# Patient Record
Sex: FEMALE | Race: OTHER | Employment: FULL TIME | URBAN - METROPOLITAN AREA
[De-identification: names, ages, dates, MRNs, and addresses within clinical notes are randomized per-mention and may not be internally consistent; named-entity substitution may affect disease eponyms.]

---

## 2017-09-09 ENCOUNTER — HOSPITAL ENCOUNTER (OUTPATIENT)
Dept: MAMMOGRAPHY | Facility: HOSPITAL | Age: 37
Discharge: HOME | End: 2017-09-09
Attending: INTERNAL MEDICINE
Payer: COMMERCIAL

## 2017-09-09 DIAGNOSIS — Z12.31 ENCOUNTER FOR SCREENING MAMMOGRAM FOR MALIGNANT NEOPLASM OF BREAST: ICD-10-CM

## 2017-09-09 PROCEDURE — 77067 SCR MAMMO BI INCL CAD: CPT | Performed by: INTERNAL MEDICINE

## 2017-12-01 ENCOUNTER — OFFICE VISIT (OUTPATIENT)
Dept: FAMILY MEDICINE CLINIC | Facility: CLINIC | Age: 37
End: 2017-12-01

## 2017-12-01 VITALS
DIASTOLIC BLOOD PRESSURE: 70 MMHG | SYSTOLIC BLOOD PRESSURE: 100 MMHG | BODY MASS INDEX: 26.58 KG/M2 | TEMPERATURE: 98 F | HEART RATE: 91 BPM | RESPIRATION RATE: 14 BRPM | HEIGHT: 63 IN | WEIGHT: 150 LBS | OXYGEN SATURATION: 98 %

## 2017-12-01 DIAGNOSIS — J06.9 VIRAL URI WITH COUGH: Primary | ICD-10-CM

## 2017-12-01 PROCEDURE — 99202 OFFICE O/P NEW SF 15 MIN: CPT | Performed by: PHYSICIAN ASSISTANT

## 2017-12-01 RX ORDER — ATORVASTATIN CALCIUM 10 MG/1
TABLET, FILM COATED ORAL
COMMUNITY
Start: 2017-10-17

## 2017-12-01 RX ORDER — NORETHINDRONE ACETATE AND ETHINYL ESTRADIOL 1.5-30(21)
KIT ORAL
COMMUNITY
Start: 2017-11-20 | End: 2018-11-08 | Stop reason: ALTCHOICE

## 2017-12-01 RX ORDER — BROMPHENIRAMINE MALEATE, PSEUDOEPHEDRINE HYDROCHLORIDE, AND DEXTROMETHORPHAN HYDROBROMIDE 2; 30; 10 MG/5ML; MG/5ML; MG/5ML
10 SYRUP ORAL 4 TIMES DAILY PRN
Qty: 120 ML | Refills: 0 | Status: SHIPPED | OUTPATIENT
Start: 2017-12-01 | End: 2018-11-08 | Stop reason: ALTCHOICE

## 2017-12-01 NOTE — PATIENT INSTRUCTIONS
Please follow up with your PCP if no improvement within 5-7 days. Go directly to the ER for any acute worsening of symptoms. Take Emergen-C one packet every 12 hours, Zicam zinc supplement     You appear to have a viral upper respiratory infection.   Anti CHILDREN'S IBUPROFEN, 4 tsp (1 capful, which is 400 mg, a usual adult dose) every 4-6 hrs.   This works a bit more immediately for your pain by it's anti-inflammatory effect directly in the throat first.    For cough and congestion:    Dextromethorphan/gua Stay home from work or school if you have fever, or symptoms below the neck (significant cough, stomach ache/nausea, diarrhea). You are likely to be shedding the most viral particles during this time.       Do not return to work or school until [facility]

## 2017-12-01 NOTE — PROGRESS NOTES
CHIEF COMPLAINT:   Patient presents with:  URI: ichy throat, nasal congestion, cough productive, headache, x 3 days. some chills, body aches. HPI:   Lauro Cabral is a 44year old female who presents for upper respiratory symptoms for  3 days.  Pamela THROAT: Oral mucosa pink, moist. Posterior pharynx is not erythematous. No exudates. Tonsils 1+/4. NECK: Supple, non-tender  LUNGS: clear to auscultation bilaterally, no wheezes, rales or rhonchi. No diminished breath sounds. Breathing is non labored. If fever persists, or returns later in illness, OR if symptoms last for >14 days, this is likely not from the virus but instead can be consistent with a secondary bacterial infection and would need re-evaluation either here in [address] or with your PCP.     Ciarra Warm mist vaporizer with camphor (Vicks VapoSteam). For nose and sinus congestion:   Pseudoephedrine (Sudafed 4- or 12-hour tablets, ask for it at the pharmacy counter). Take for the next 3-5; follow the package directions.     Phenylephrine (Sudafed PE

## 2018-01-04 ENCOUNTER — MED REC SCAN ONLY (OUTPATIENT)
Dept: HEMATOLOGY/ONCOLOGY | Facility: HOSPITAL | Age: 38
End: 2018-01-04

## 2018-11-08 ENCOUNTER — OFFICE VISIT (OUTPATIENT)
Dept: FAMILY MEDICINE CLINIC | Facility: CLINIC | Age: 38
End: 2018-11-08
Payer: COMMERCIAL

## 2018-11-08 VITALS
HEIGHT: 63 IN | HEART RATE: 100 BPM | SYSTOLIC BLOOD PRESSURE: 118 MMHG | DIASTOLIC BLOOD PRESSURE: 68 MMHG | WEIGHT: 160 LBS | BODY MASS INDEX: 28.35 KG/M2 | RESPIRATION RATE: 12 BRPM | OXYGEN SATURATION: 98 % | TEMPERATURE: 99 F

## 2018-11-08 DIAGNOSIS — A08.4 VIRAL GASTROENTERITIS: Primary | ICD-10-CM

## 2018-11-08 PROCEDURE — 99213 OFFICE O/P EST LOW 20 MIN: CPT | Performed by: NURSE PRACTITIONER

## 2018-11-08 NOTE — PATIENT INSTRUCTIONS
· Rest as much as possible  · Try to drink lots of fluids. Start with small sips of water every 15 minutes as long as you can keep fluid down. · Avoid fruit juices and soda. Try water, Pedialyte, and/or 1/2 strength Gatorade/Power Aid.   Drink 1-2 oz with influenza. In fact, it can happen from food poisoning, but it can also happen when germs are passed from person-to-person or contaminated surface (toothbrush, cutting board, toilet) to a person.   Either illness can cause these symptoms:  · Abdominal p after using cutting boards, counter-tops, and knives (and other utensils) that have been in contact with raw foods. · Wash and then peel fruits and vegetables. · Keep uncooked meats away from cooked and ready-to-eat foods.   · Use a food thermometer when poultry, or fish. · Limit fiber. Don’t eat raw or cooked vegetables, fresh fruits except bananas, and bran cereals. · Limit caffeine and chocolate. · Don’t use spices or seasonings except salt.   · Resume a normal diet over time, as you feel better and y

## 2018-11-08 NOTE — PROGRESS NOTES
CHIEF COMPLAINT:   Patient presents with:  Nausea  Vomiting  Body ache and/or chills      HPI:   Sagrario Hyde is a 43year old female who presents for complaints of nausea and vomitins for almost 24 hours.   Reports generalized mild cramping abdominal GI:See HPI  NEURO: denies headaches, loss of bowel or bladder control    EXAM:   /68 (BP Location: Right arm, Patient Position: Sitting, Cuff Size: adult)   Pulse 100   Temp 98.9 °F (37.2 °C) (Oral)   Resp 12   Ht 63\"   Wt 160 lb   SpO2 98%   BMI 28 · Avoid fruit juices and soda. Try water, Pedialyte, and/or 1/2 strength Gatorade/Power Aid. Drink 1-2 oz. Every hour when tolerated. Goal is to drink 1500-2000ml per day.   · Advance diet slowly as tolerate: start with clear liquids (jello, broth) then Viral gastroenteritis is also an illness from a virus that affects the stomach and intestinal tract. Many people call it the “stomach flu,” but it has nothing to do with influenza.  In fact, it can happen from food poisoning, but it can also happen when terri · Wash your hands or use alcohol-based hand  before and after preparing food. Keep in mind that people with diarrhea or vomiting should not prepare food for others.   · Wash your hands or use alcohol-based hand  after using cutting boards, · Plain noodles, rice, mashed potatoes, chicken noodle soup, or rice soup  · Unsweetened canned fruit (no pineapple)  · Bananas  As you recover:  · Limit fat intake to less than 15 grams per day.  Don’t eat margarine, butter, oils, mayonnaise, sauces, Ines López The patient indicates understanding of these issues and agrees to the plan.

## 2019-06-29 ENCOUNTER — HOSPITAL ENCOUNTER (OUTPATIENT)
Dept: MAMMOGRAPHY | Facility: HOSPITAL | Age: 39
Discharge: HOME | End: 2019-06-29
Attending: INTERNAL MEDICINE
Payer: COMMERCIAL

## 2019-06-29 DIAGNOSIS — Z12.31 ENCOUNTER FOR SCREENING MAMMOGRAM FOR MALIGNANT NEOPLASM OF BREAST: ICD-10-CM

## 2019-06-29 PROCEDURE — 77063 BREAST TOMOSYNTHESIS BI: CPT | Performed by: INTERNAL MEDICINE

## 2019-06-29 PROCEDURE — 77067 SCR MAMMO BI INCL CAD: CPT | Performed by: INTERNAL MEDICINE

## 2019-07-18 ENCOUNTER — HOSPITAL ENCOUNTER (OUTPATIENT)
Dept: ULTRASOUND IMAGING | Facility: HOSPITAL | Age: 39
Discharge: HOME | End: 2019-07-18
Attending: INTERNAL MEDICINE
Payer: COMMERCIAL

## 2019-07-18 ENCOUNTER — HOSPITAL ENCOUNTER (OUTPATIENT)
Dept: MAMMOGRAPHY | Facility: HOSPITAL | Age: 39
Discharge: HOME | End: 2019-07-18
Attending: INTERNAL MEDICINE
Payer: COMMERCIAL

## 2019-07-18 DIAGNOSIS — R92.8 ABNORMAL MAMMOGRAM: ICD-10-CM

## 2019-07-18 PROCEDURE — 77065 DX MAMMO INCL CAD UNI: CPT | Performed by: INTERNAL MEDICINE

## 2019-07-18 PROCEDURE — 77061 BREAST TOMOSYNTHESIS UNI: CPT | Performed by: INTERNAL MEDICINE

## 2019-07-18 PROCEDURE — 76642 ULTRASOUND BREAST LIMITED: CPT | Performed by: INTERNAL MEDICINE

## 2019-07-18 RX ORDER — OMEGA-3-ACID ETHYL ESTERS 1 G/1
1 CAPSULE, LIQUID FILLED ORAL DAILY
COMMUNITY

## 2019-07-18 NOTE — IMAGING NOTE
This nurse introduced self and role of breast coordinator. Discussed recommended breast biopsy with patient. Pt was recommended by  Dr Dc Melendez  to have a  Right  breast ultrasound guided biopsy. She will stop  Fish oil.  She was very tearful emotio patient Radiology’s protocol regarding medications, as well as over-the-counter vitamin or herbal supplements, as follows:   -All herbal supplements, Vitamin E, Fish Oil  green tea ,all nsaids like   ibuprofen, Motrin, Advil, Aleve, or other anti-inflammat check in with diagnostic east .  Educated patient that some soreness  may occur after biopsy. Discussed use of a supportive bra and ice packs after procedure, to decrease soreness. Tylenol only for discomfort unless they have an allergy to tylenol .   Dis

## 2019-07-19 ENCOUNTER — TELEPHONE (OUTPATIENT)
Dept: MAMMOGRAPHY | Facility: HOSPITAL | Age: 39
End: 2019-07-19

## 2019-07-19 NOTE — TELEPHONE ENCOUNTER
Called to review denies taking blood thinning meds for month. Reinforced biopsy instructions. She will eat breakfast and is aware 2 days for results no heavylifting x2-3 days and no hot tubs baths swimming x5 days and until bx site closed and healed.  Mrs Nazario

## 2019-07-22 ENCOUNTER — HOSPITAL ENCOUNTER (OUTPATIENT)
Dept: ULTRASOUND IMAGING | Facility: HOSPITAL | Age: 39
Discharge: HOME | End: 2019-07-22
Attending: INTERNAL MEDICINE
Payer: COMMERCIAL

## 2019-07-22 VITALS — SYSTOLIC BLOOD PRESSURE: 110 MMHG | DIASTOLIC BLOOD PRESSURE: 67 MMHG | HEART RATE: 76 BPM

## 2019-07-22 DIAGNOSIS — N63.10 BREAST MASS, RIGHT: ICD-10-CM

## 2019-07-22 PROCEDURE — 88342 IMHCHEM/IMCYTCHM 1ST ANTB: CPT

## 2019-07-22 PROCEDURE — 19083 BX BREAST 1ST LESION US IMAG: CPT | Performed by: INTERNAL MEDICINE

## 2019-07-22 PROCEDURE — 88305 TISSUE EXAM BY PATHOLOGIST: CPT

## 2019-07-22 NOTE — PROCEDURES
[facility]  Procedure Note    Cheryl Tuvamsi Patient Status:  Outpatient    1980 MRN [id number]   Location [address] [facility] Attending Milind Mabry Day # 0 PCP MACKENZIE ESPINOZA     Procedure: Right breast u

## 2019-07-24 ENCOUNTER — TELEPHONE (OUTPATIENT)
Dept: HEMATOLOGY/ONCOLOGY | Facility: HOSPITAL | Age: 39
End: 2019-07-24

## 2019-07-24 ENCOUNTER — OFFICE VISIT (OUTPATIENT)
Dept: FAMILY MEDICINE CLINIC | Facility: CLINIC | Age: 39
End: 2019-07-24
Payer: COMMERCIAL

## 2019-07-24 DIAGNOSIS — G89.18 POST PROCEDURE DISCOMFORT: Primary | ICD-10-CM

## 2019-07-24 NOTE — TELEPHONE ENCOUNTER
Patient is s/p right breast US guided biopsy, performed 7/22/19. Phoned patient and introduced myself as Breast RN Navigator. Patient denies right breast complaints at this time.     Shared with patient benign pathology results as follows:    Right breast

## 2019-07-24 NOTE — PROGRESS NOTES
Pt had needle breast biopsy 7/22/19 and is requesting work note to not have to lift groceries or push carts as a part of her usual job. Instructed pt to refer to her discharge instructions or contact Oncology Nurse Navigator.  On-line Nurse Navigator note s

## 2020-01-17 ENCOUNTER — HOSPITAL ENCOUNTER (OUTPATIENT)
Dept: ULTRASOUND IMAGING | Facility: HOSPITAL | Age: 40
Discharge: HOME | End: 2020-01-17
Attending: FAMILY MEDICINE
Payer: COMMERCIAL

## 2020-01-17 DIAGNOSIS — Z09 FOLLOW-UP EXAM, 3-6 MONTHS SINCE PREVIOUS EXAM: ICD-10-CM

## 2020-01-17 PROCEDURE — 76642 ULTRASOUND BREAST LIMITED: CPT | Performed by: FAMILY MEDICINE

## 2020-01-19 ENCOUNTER — OFFICE VISIT (OUTPATIENT)
Dept: FAMILY MEDICINE CLINIC | Facility: CLINIC | Age: 40
End: 2020-01-19
Payer: COMMERCIAL

## 2020-01-19 VITALS
HEART RATE: 92 BPM | DIASTOLIC BLOOD PRESSURE: 57 MMHG | SYSTOLIC BLOOD PRESSURE: 124 MMHG | HEIGHT: 63.5 IN | TEMPERATURE: 98 F | OXYGEN SATURATION: 99 % | RESPIRATION RATE: 16 BRPM | BODY MASS INDEX: 31.15 KG/M2 | WEIGHT: 178 LBS

## 2020-01-19 DIAGNOSIS — J02.0 STREP PHARYNGITIS: Primary | ICD-10-CM

## 2020-01-19 LAB
CONTROL LINE PRESENT WITH A CLEAR BACKGROUND (YES/NO): YES YES/NO
KIT LOT #: ABNORMAL NUMERIC
STREP GRP A CUL-SCR: POSITIVE

## 2020-01-19 PROCEDURE — 87880 STREP A ASSAY W/OPTIC: CPT | Performed by: NURSE PRACTITIONER

## 2020-01-19 PROCEDURE — 99213 OFFICE O/P EST LOW 20 MIN: CPT | Performed by: NURSE PRACTITIONER

## 2020-01-19 RX ORDER — AMOXICILLIN 500 MG/1
500 TABLET, FILM COATED ORAL 2 TIMES DAILY
Qty: 20 TABLET | Refills: 0 | Status: SHIPPED | OUTPATIENT
Start: 2020-01-19 | End: 2020-01-29

## 2020-01-19 NOTE — PROGRESS NOTES
CHIEF COMPLAINT:   Patient presents with: Tonsillitis: swollen with green mucous      HPI:     Crissy Suggs is a 35year old female presents to clinic with symptoms of sore throat. Patient has had since yesterdays.  Symptoms have worsened since ons THROAT: oral mucosa pink, moist. Posterior pharynx erythematous and injected. No exudates. Tonsils 2/4. Breath not malodorous. No uvular deviation. No drooling. No trismus. No muffled voice. NECK: supple  LUNGS: clear to auscultation bilaterally.   No · Do not share utensils or drinks with anyone.    · Good handwashing.    · Get plenty of rest.    · Can use over the counter benzocaine such as Cepacol throat lozenges or Chloroseptic throat spray to soothe sore throat.    · Warm salt water gargles 2-3 natty Use medicine for more relief  Over-the-counter medicine can reduce sore throat symptoms. Ask your pharmacist if you have questions about which medicine to use:  · Ease pain with anesthetic sprays. Aspirin or an aspirin substitute also helps.  Remember, shefali · Rest at home. Drink plenty of fluids to you won't get dehydrated. · No work or school for the first 2 days of taking the antibiotics. After this time, you will not be contagious.  You can then return to school or work if you are feeling better.   · Dane Johnson · Don’t have close contact with people who have sore throats, colds, or other upper respiratory infections. · Don’t smoke, and stay away from secondhand smoke. Date Last Reviewed: 11/1/2017  © 4521-7839 [employer] [address].  [address] [facility] [place] Jamir,

## 2020-01-19 NOTE — PATIENT INSTRUCTIONS
Comfort measures explained and discussed:    · Take the full course of your antibiotic even if you are feeling better. · You are considered to be contagious until you have been on antibiotics for 24 hours.    · You can return to school and/or work o · Run a cool-air humidifier in your room overnight. · Avoid cigarette smoke.   · Suck on throat lozenges, cough drops, hard candy, ice chips, or frozen fruit-juice bars. Use the sugar-free versions if your diet or medical condition requires them.   Gargle © 2842-0468 [employer] [address]. [address] [facility], [facility] [place]. All rights reserved. This information is not intended as a substitute for professional medical care. Always follow your healthcare professional's instructions.           Phary Call your healthcare provider right away if any of these occur:  · Fever of 100.4ºF (38ºC) or higher, or as directed by your healthcare provider  · New or worsening ear pain, sinus pain, or headache  · Painful lumps in the back of neck  · Stiff neck  · Lym

## 2020-06-15 ENCOUNTER — OFFICE VISIT (OUTPATIENT)
Dept: AUDIOLOGY | Facility: CLINIC | Age: 40
End: 2020-06-15
Payer: COMMERCIAL

## 2020-06-15 ENCOUNTER — OFFICE VISIT (OUTPATIENT)
Dept: OTOLARYNGOLOGY | Facility: CLINIC | Age: 40
End: 2020-06-15
Payer: COMMERCIAL

## 2020-06-15 VITALS
TEMPERATURE: 98 F | BODY MASS INDEX: 31.18 KG/M2 | DIASTOLIC BLOOD PRESSURE: 75 MMHG | HEIGHT: 63 IN | WEIGHT: 176 LBS | SYSTOLIC BLOOD PRESSURE: 117 MMHG

## 2020-06-15 DIAGNOSIS — H90.42 SENSORINEURAL HEARING LOSS (SNHL) OF LEFT EAR WITH UNRESTRICTED HEARING OF RIGHT EAR: Primary | ICD-10-CM

## 2020-06-15 DIAGNOSIS — M26.609 TMJ (TEMPOROMANDIBULAR JOINT SYNDROME): ICD-10-CM

## 2020-06-15 DIAGNOSIS — H91.93 BILATERAL HEARING LOSS, UNSPECIFIED HEARING LOSS TYPE: Primary | ICD-10-CM

## 2020-06-15 DIAGNOSIS — F45.8 BRUXISM (TEETH GRINDING): ICD-10-CM

## 2020-06-15 PROCEDURE — 99243 OFF/OP CNSLTJ NEW/EST LOW 30: CPT | Performed by: OTOLARYNGOLOGY

## 2020-06-15 PROCEDURE — 92557 COMPREHENSIVE HEARING TEST: CPT | Performed by: AUDIOLOGIST

## 2020-06-15 PROCEDURE — 92567 TYMPANOMETRY: CPT | Performed by: AUDIOLOGIST

## 2020-06-15 NOTE — PROGRESS NOTES
AUDIOLOGY REPORT      Jesse Reis is a 38year old female     Referring Provider: Goldie Zaragoza   YOB: 1980  Medical Record: [id number]      Patient Hearing History:  Patient referred by Dr. Vy Sheets for hearing loss.   Patient reports that

## 2020-07-17 ENCOUNTER — HOSPITAL ENCOUNTER (OUTPATIENT)
Dept: MAMMOGRAPHY | Facility: HOSPITAL | Age: 40
Discharge: HOME | End: 2020-07-17
Attending: INTERNAL MEDICINE
Payer: COMMERCIAL

## 2020-07-17 ENCOUNTER — HOSPITAL ENCOUNTER (OUTPATIENT)
Dept: ULTRASOUND IMAGING | Facility: HOSPITAL | Age: 40
Discharge: HOME | End: 2020-07-17
Attending: INTERNAL MEDICINE
Payer: COMMERCIAL

## 2020-07-17 DIAGNOSIS — Z12.31 ENCOUNTER FOR SCREENING MAMMOGRAM FOR MALIGNANT NEOPLASM OF BREAST: ICD-10-CM

## 2020-07-17 PROCEDURE — 77066 DX MAMMO INCL CAD BI: CPT | Performed by: INTERNAL MEDICINE

## 2020-07-17 PROCEDURE — 77062 BREAST TOMOSYNTHESIS BI: CPT | Performed by: INTERNAL MEDICINE

## 2020-07-17 PROCEDURE — 76642 ULTRASOUND BREAST LIMITED: CPT | Performed by: INTERNAL MEDICINE

## 2020-08-18 ENCOUNTER — OFFICE VISIT (OUTPATIENT)
Dept: OBGYN CLINIC | Facility: CLINIC | Age: 40
End: 2020-08-18
Payer: COMMERCIAL

## 2020-08-18 VITALS
DIASTOLIC BLOOD PRESSURE: 64 MMHG | SYSTOLIC BLOOD PRESSURE: 93 MMHG | BODY MASS INDEX: 31 KG/M2 | WEIGHT: 174.19 LBS | HEART RATE: 89 BPM

## 2020-08-18 DIAGNOSIS — N92.0 MENORRHAGIA WITH REGULAR CYCLE: ICD-10-CM

## 2020-08-18 DIAGNOSIS — R92.8 ABNORMAL MAMMOGRAM OF RIGHT BREAST: ICD-10-CM

## 2020-08-18 DIAGNOSIS — Z01.419 ENCOUNTER FOR GYNECOLOGICAL EXAMINATION WITHOUT ABNORMAL FINDING: Primary | ICD-10-CM

## 2020-08-18 PROCEDURE — 3074F SYST BP LT 130 MM HG: CPT | Performed by: OBSTETRICS & GYNECOLOGY

## 2020-08-18 PROCEDURE — 99386 PREV VISIT NEW AGE 40-64: CPT | Performed by: OBSTETRICS & GYNECOLOGY

## 2020-08-18 PROCEDURE — 3078F DIAST BP <80 MM HG: CPT | Performed by: OBSTETRICS & GYNECOLOGY

## 2020-08-23 PROBLEM — N92.0 MENORRHAGIA WITH REGULAR CYCLE: Status: ACTIVE | Noted: 2020-08-23

## 2020-08-24 NOTE — PROGRESS NOTES
HPI:    Patient ID: Brijesh Singleton is a 38year old female. HPI \" Jewels \" is [phone] with menses q 28d / 5d / heavy chronically. TL for [facility]. She did have a lesion protruding from anal area post op second delivery.  It actually turned out to be a Soft. She exhibits no distension and no mass. There is no tenderness. There is no rebound and no guarding. Genitourinary:    Vagina and uterus normal.   No breast discharge. There is no rash or lesion on the right labia.  There is no rash or lesion on the

## 2020-12-22 ENCOUNTER — HOSPITAL ENCOUNTER (OUTPATIENT)
Dept: ULTRASOUND IMAGING | Facility: HOSPITAL | Age: 40
Discharge: HOME | End: 2020-12-22
Attending: INTERNAL MEDICINE
Payer: COMMERCIAL

## 2020-12-22 DIAGNOSIS — E04.9 NONTOXIC GOITER, UNSPECIFIED: ICD-10-CM

## 2020-12-22 PROCEDURE — 76536 US EXAM OF HEAD AND NECK: CPT | Performed by: INTERNAL MEDICINE

## 2021-01-19 ENCOUNTER — HOSPITAL ENCOUNTER (OUTPATIENT)
Dept: ULTRASOUND IMAGING | Facility: HOSPITAL | Age: 41
Discharge: HOME | End: 2021-01-19
Attending: OBSTETRICS & GYNECOLOGY
Payer: COMMERCIAL

## 2021-01-19 DIAGNOSIS — R92.8 ABNORMAL MAMMOGRAM OF RIGHT BREAST: ICD-10-CM

## 2021-01-19 PROCEDURE — 76642 ULTRASOUND BREAST LIMITED: CPT | Performed by: OBSTETRICS & GYNECOLOGY

## 2021-01-20 ENCOUNTER — TELEPHONE (OUTPATIENT)
Dept: OBGYN CLINIC | Facility: CLINIC | Age: 41
End: 2021-01-20

## 2021-01-20 NOTE — TELEPHONE ENCOUNTER
----- Message from Marie Conroy DO sent at 1/19/2021 11:36 AM CST -----  Narendra Mcdonough. The ultrasound appears stable and benign. I'll ask our Nurse to place your 6 month follow up bilateral screen mammogram for July as I won't need to see you until August. See you then.

## 2021-03-12 DIAGNOSIS — Z23 NEED FOR VACCINATION: ICD-10-CM

## 2021-03-16 ENCOUNTER — IMMUNIZATION (OUTPATIENT)
Dept: LAB | Facility: HOSPITAL | Age: 41
End: 2021-03-16
Attending: HOSPITALIST
Payer: COMMERCIAL

## 2021-03-16 DIAGNOSIS — Z23 NEED FOR VACCINATION: Primary | ICD-10-CM

## 2021-03-16 PROCEDURE — 0011A SARSCOV2 VAC 100MCG/0.5ML IM: CPT

## 2021-04-13 ENCOUNTER — IMMUNIZATION (OUTPATIENT)
Dept: LAB | Facility: HOSPITAL | Age: 41
End: 2021-04-13
Attending: EMERGENCY MEDICINE
Payer: COMMERCIAL

## 2021-04-13 DIAGNOSIS — Z23 NEED FOR VACCINATION: Primary | ICD-10-CM

## 2021-04-13 PROCEDURE — 0012A SARSCOV2 VAC 100MCG/0.5ML IM: CPT

## 2021-07-16 ENCOUNTER — HOSPITAL ENCOUNTER (OUTPATIENT)
Dept: MAMMOGRAPHY | Facility: HOSPITAL | Age: 41
Discharge: HOME | End: 2021-07-16
Attending: OBSTETRICS & GYNECOLOGY
Payer: COMMERCIAL

## 2021-07-16 DIAGNOSIS — Z12.31 ENCOUNTER FOR SCREENING MAMMOGRAM FOR MALIGNANT NEOPLASM OF BREAST: ICD-10-CM

## 2021-07-16 PROCEDURE — 77067 SCR MAMMO BI INCL CAD: CPT | Performed by: OBSTETRICS & GYNECOLOGY

## 2021-07-16 PROCEDURE — 77063 BREAST TOMOSYNTHESIS BI: CPT | Performed by: OBSTETRICS & GYNECOLOGY

## 2021-08-20 ENCOUNTER — OFFICE VISIT (OUTPATIENT)
Dept: OBGYN CLINIC | Facility: CLINIC | Age: 41
End: 2021-08-20
Payer: COMMERCIAL

## 2021-08-20 VITALS
SYSTOLIC BLOOD PRESSURE: 113 MMHG | DIASTOLIC BLOOD PRESSURE: 74 MMHG | WEIGHT: 177 LBS | HEART RATE: 97 BPM | BODY MASS INDEX: 31 KG/M2

## 2021-08-20 DIAGNOSIS — Q51.3 BICORNUATE UTERUS: ICD-10-CM

## 2021-08-20 DIAGNOSIS — N92.0 MENORRHAGIA WITH REGULAR CYCLE: ICD-10-CM

## 2021-08-20 DIAGNOSIS — Z01.419 ENCOUNTER FOR GYNECOLOGICAL EXAMINATION WITHOUT ABNORMAL FINDING: Primary | ICD-10-CM

## 2021-08-20 DIAGNOSIS — Z12.31 SCREENING MAMMOGRAM, ENCOUNTER FOR: ICD-10-CM

## 2021-08-20 DIAGNOSIS — Z12.4 SCREENING FOR MALIGNANT NEOPLASM OF CERVIX: ICD-10-CM

## 2021-08-20 PROCEDURE — 3074F SYST BP LT 130 MM HG: CPT | Performed by: OBSTETRICS & GYNECOLOGY

## 2021-08-20 PROCEDURE — 3078F DIAST BP <80 MM HG: CPT | Performed by: OBSTETRICS & GYNECOLOGY

## 2021-08-20 PROCEDURE — 99396 PREV VISIT EST AGE 40-64: CPT | Performed by: OBSTETRICS & GYNECOLOGY

## 2021-08-20 RX ORDER — ATORVASTATIN CALCIUM 20 MG/1
20 TABLET, FILM COATED ORAL NIGHTLY
COMMUNITY

## 2021-08-23 LAB — HPV I/H RISK 1 DNA SPEC QL NAA+PROBE: NEGATIVE

## 2021-08-30 PROBLEM — Q51.3 BICORNUATE UTERUS: Status: ACTIVE | Noted: 2021-08-30

## 2021-08-30 NOTE — PROGRESS NOTES
HPI/Subjective:   Patient ID: Zaire Hawkins is a 42year old female. HPI   with menses q 28d / 5d / heavy flow affecting her ability to do activities. She has a known bicornuate uterus. No new family medical issues.  Kids are well: Nomi vale sounds: Normal breath sounds. No wheezing or rales. Abdominal:      General: There is no distension. Palpations: Abdomen is soft. There is no mass. Tenderness: There is no abdominal tenderness. There is no guarding or rebound.    Genitourinary:

## 2022-03-22 ENCOUNTER — ORDER TRANSCRIPTION (OUTPATIENT)
Dept: ADMINISTRATIVE | Facility: HOSPITAL | Age: 42
End: 2022-03-22

## 2022-07-18 ENCOUNTER — HOSPITAL ENCOUNTER (OUTPATIENT)
Dept: MAMMOGRAPHY | Facility: HOSPITAL | Age: 42
Discharge: HOME | End: 2022-07-18
Attending: OBSTETRICS & GYNECOLOGY
Payer: COMMERCIAL

## 2022-07-18 DIAGNOSIS — Z12.31 SCREENING MAMMOGRAM, ENCOUNTER FOR: ICD-10-CM

## 2022-07-18 PROCEDURE — 77063 BREAST TOMOSYNTHESIS BI: CPT | Performed by: OBSTETRICS & GYNECOLOGY

## 2022-07-18 PROCEDURE — 77067 SCR MAMMO BI INCL CAD: CPT | Performed by: OBSTETRICS & GYNECOLOGY

## 2022-09-06 ENCOUNTER — HOSPITAL ENCOUNTER (OUTPATIENT)
Dept: CT IMAGING | Facility: HOSPITAL | Age: 42
Discharge: HOME | End: 2022-09-06
Attending: INTERNAL MEDICINE

## 2022-09-06 DIAGNOSIS — Z13.6 SCREENING FOR CARDIOVASCULAR CONDITION: ICD-10-CM

## 2022-09-06 NOTE — PROGRESS NOTES
Date of Service 9/6/2022    Florencio Cullen  Date of Birth 4/26/1980    Patient Age: 36year old    PCP: Usha Meadows, DO  [address] [place] [address]    Consult Type  Type Scan/Screening: Heart Scan  Preliminary Heart Scan Score: 0                Body Mass Index  There is no height or weight on file to calculate BMI. Lipid Profile  No Lipid results on file in last 5 years . Today - NON FASTING  Total - 201  HDL - 38 goal is greater than 55  LDL - NA  Goal is less than 100  Triglycerides - 567  Goal is less than 150    She is on cholesterol medication. Nurse Review  Risk factor information and results reviewed with Nurse: Yes     /74  No medication    Recommended Follow Up:  Consult your physician regarding[de-identified] Final Heart Scan Report; Discuss potential for Incidental Finding; Cholesterol Results (Non-Fasting)    No data recorded      Recommendations for Change:  Nutrition Changes: Low Saturated Fat;Low Fat Dairy; Increase Fiber  Cholesterol Modification (goal of therapy depends upon your risk): Increase HDL (Healthy/Good) Normal >45 Men >55 Women;Decrease Triglycerides (Ugly) Normal <150     Smoking Cessation: No Change Needed  Weight Management: Decrease Current Weight     Repeat Heart Scan: 5 years if Calcium Score is 0. 0; Discuss with your Physician          Silvestre Recommended Resources:  Recommended Resources: Upcoming Classes, Medical Services and Health Library [email or web address]. Sridevi Crockett RN        Please Contact the Nurse Heart Line with any Questions or Concerns [phone].

## 2023-08-21 ENCOUNTER — OFFICE VISIT (OUTPATIENT)
Dept: OBGYN CLINIC | Facility: CLINIC | Age: 43
End: 2023-08-21

## 2023-08-21 VITALS
HEIGHT: 63.7 IN | SYSTOLIC BLOOD PRESSURE: 118 MMHG | BODY MASS INDEX: 30.59 KG/M2 | DIASTOLIC BLOOD PRESSURE: 76 MMHG | WEIGHT: 177 LBS | HEART RATE: 79 BPM

## 2023-08-21 DIAGNOSIS — N92.0 MENORRHAGIA WITH REGULAR CYCLE: ICD-10-CM

## 2023-08-21 DIAGNOSIS — Z12.31 SCREENING MAMMOGRAM FOR BREAST CANCER: ICD-10-CM

## 2023-08-21 DIAGNOSIS — Z01.411 ENCOUNTER FOR GYNECOLOGICAL EXAMINATION WITH ABNORMAL FINDING: Primary | ICD-10-CM

## 2023-08-21 PROCEDURE — 3008F BODY MASS INDEX DOCD: CPT | Performed by: OBSTETRICS & GYNECOLOGY

## 2023-08-21 PROCEDURE — 3074F SYST BP LT 130 MM HG: CPT | Performed by: OBSTETRICS & GYNECOLOGY

## 2023-08-21 PROCEDURE — 3078F DIAST BP <80 MM HG: CPT | Performed by: OBSTETRICS & GYNECOLOGY

## 2023-08-21 PROCEDURE — 99396 PREV VISIT EST AGE 40-64: CPT | Performed by: OBSTETRICS & GYNECOLOGY

## 2023-08-21 NOTE — PROGRESS NOTES
Subjective:   Patient ID: Florian Mitchell is a 39year old female. HPI   with menses q 28d / 5d / 4 heavy days. No IM / PC bleeding. TL for [facility]. No new family medical issues. She has OA right knee. Also occasional MITCH w/o pad use. History/Other:   Review of Systems   Constitutional:  Negative for appetite change, fatigue and unexpected weight change. Eyes:  Negative for visual disturbance. Respiratory:  Negative for cough and shortness of breath. Cardiovascular:  Negative for chest pain, palpitations and leg swelling. Gastrointestinal:  Negative for abdominal distention, abdominal pain, blood in stool, constipation and diarrhea. Genitourinary:  Positive for dyspareunia (chronic entry dyspareunia improved with OTC lube) and menstrual problem. Negative for dysuria, frequency, pelvic pain and urgency. Musculoskeletal:  Negative for arthralgias and myalgias. Skin:  Negative for rash. Neurological:  Negative for weakness, numbness and headaches. Psychiatric/Behavioral:  Negative for dysphoric mood. The patient is not nervous/anxious. Current Outpatient Medications   Medication Sig Dispense Refill    atorvastatin 20 MG Oral Tab Take 20 mg by mouth nightly. Multiple Vitamin (MULTI-VITAMIN DAILY OR) Take by mouth. IRON CR OR Take by mouth. Cyanocobalamin (VITAMIN B 12 OR) Take by mouth. Ergocalciferol (VITAMIN D OR) Take by mouth. Omega-3-acid Ethyl Esters 1 g Oral Cap Take 1 g by mouth daily. atorvastatin 10 MG Oral Tab  (Patient not taking: Reported on 2021 )       Allergies:No Known Allergies    Objective:   Physical Exam  Constitutional:       General: She is not in acute distress. Appearance: She is well-developed. She is not diaphoretic. Neck:      Thyroid: No thyromegaly. Cardiovascular:      Rate and Rhythm: Normal rate and regular rhythm. Heart sounds: Normal heart sounds. No murmur heard.   Pulmonary:      Effort: Pulmonary effort is normal.      Breath sounds: Normal breath sounds. No wheezing or rales. Chest:   Breasts:     Right: Normal. No mass, nipple discharge, skin change or tenderness. Left: Normal. No mass, nipple discharge, skin change or tenderness. Comments:     Abdominal:      General: There is no distension. Palpations: Abdomen is soft. There is no mass. Tenderness: There is no abdominal tenderness. There is no guarding or rebound. Genitourinary:     Labia:         Right: No rash or lesion. Left: No rash or lesion. Vagina: Normal. No vaginal discharge. Cervix: No cervical motion tenderness or discharge. Uterus: Not enlarged and not tender. Adnexa:         Right: No mass, tenderness or fullness. Left: No mass, tenderness or fullness. Musculoskeletal:         General: No tenderness. Cervical back: Neck supple. Lymphadenopathy:      Cervical: No cervical adenopathy. Upper Body:      Right upper body: No supraclavicular, axillary or pectoral adenopathy. Left upper body: No supraclavicular, axillary or pectoral adenopathy. Neurological:      Mental Status: She is alert. Assessment & Plan:   Encounter for gynecological examination with abnormal finding  (primary encounter diagnosis)  Screening mammogram for breast cancer  Menorrhagia with regular cycle    No orders of the defined types were placed in this encounter.       Meds This Visit:  Requested Prescriptions      No prescriptions requested or ordered in this encounter       Imaging & Referrals:  [facility] YULISSA 2D+3D SCREENING BILAT (CPT=[id number]/[id number])

## 2023-11-20 ENCOUNTER — IMMUNIZATION (OUTPATIENT)
Dept: LAB | Age: 43
End: 2023-11-20
Attending: EMERGENCY MEDICINE
Payer: COMMERCIAL

## 2023-11-20 DIAGNOSIS — Z23 NEED FOR VACCINATION: Primary | ICD-10-CM

## 2023-11-20 PROCEDURE — 90471 IMMUNIZATION ADMIN: CPT

## 2023-11-20 PROCEDURE — 90686 IIV4 VACC NO PRSV 0.5 ML IM: CPT

## 2023-11-21 ENCOUNTER — HOSPITAL ENCOUNTER (OUTPATIENT)
Dept: MAMMOGRAPHY | Facility: HOSPITAL | Age: 43
Discharge: HOME | End: 2023-11-21
Attending: OBSTETRICS & GYNECOLOGY
Payer: COMMERCIAL

## 2023-11-21 DIAGNOSIS — Z12.31 SCREENING MAMMOGRAM FOR BREAST CANCER: ICD-10-CM

## 2023-11-21 PROCEDURE — 77063 BREAST TOMOSYNTHESIS BI: CPT | Performed by: OBSTETRICS & GYNECOLOGY

## 2023-11-21 PROCEDURE — 77067 SCR MAMMO BI INCL CAD: CPT | Performed by: OBSTETRICS & GYNECOLOGY

## 2023-11-27 ENCOUNTER — OFFICE VISIT (OUTPATIENT)
Dept: FAMILY MEDICINE CLINIC | Facility: CLINIC | Age: 43
End: 2023-11-27
Payer: COMMERCIAL

## 2023-11-27 VITALS
HEART RATE: 95 BPM | DIASTOLIC BLOOD PRESSURE: 65 MMHG | WEIGHT: 181.19 LBS | SYSTOLIC BLOOD PRESSURE: 102 MMHG | TEMPERATURE: 98 F | HEIGHT: 63.7 IN | BODY MASS INDEX: 31.32 KG/M2 | OXYGEN SATURATION: 96 %

## 2023-11-27 DIAGNOSIS — R05.1 ACUTE COUGH: ICD-10-CM

## 2023-11-27 DIAGNOSIS — H66.91 RIGHT OTITIS MEDIA, UNSPECIFIED OTITIS MEDIA TYPE: Primary | ICD-10-CM

## 2023-11-27 PROBLEM — N93.9 ABNORMAL UTERINE BLEEDING (AUB): Status: ACTIVE | Noted: 2019-03-11

## 2023-11-27 PROBLEM — E78.00 HYPERCHOLESTEROLEMIA: Status: ACTIVE | Noted: 2023-08-08

## 2023-11-27 PROCEDURE — 3074F SYST BP LT 130 MM HG: CPT | Performed by: STUDENT IN AN ORGANIZED HEALTH CARE EDUCATION/TRAINING PROGRAM

## 2023-11-27 PROCEDURE — 3078F DIAST BP <80 MM HG: CPT | Performed by: STUDENT IN AN ORGANIZED HEALTH CARE EDUCATION/TRAINING PROGRAM

## 2023-11-27 PROCEDURE — 3008F BODY MASS INDEX DOCD: CPT | Performed by: STUDENT IN AN ORGANIZED HEALTH CARE EDUCATION/TRAINING PROGRAM

## 2023-11-27 PROCEDURE — 99203 OFFICE O/P NEW LOW 30 MIN: CPT | Performed by: STUDENT IN AN ORGANIZED HEALTH CARE EDUCATION/TRAINING PROGRAM

## 2023-11-27 RX ORDER — MELOXICAM 15 MG/1
15 TABLET ORAL DAILY
COMMUNITY
Start: 2023-09-06 | End: 2023-12-03

## 2023-11-27 RX ORDER — ALBUTEROL SULFATE 90 UG/1
2 AEROSOL, METERED RESPIRATORY (INHALATION) EVERY 4 HOURS PRN
Qty: 1 EACH | Refills: 0 | Status: SHIPPED | OUTPATIENT
Start: 2023-11-27 | End: 2024-11-26

## 2023-11-27 RX ORDER — AZITHROMYCIN 250 MG/1
TABLET, FILM COATED ORAL
Qty: 6 TABLET | Refills: 0 | Status: SHIPPED | OUTPATIENT
Start: 2023-11-27 | End: 2023-12-01

## 2023-11-27 RX ORDER — NORETHINDRONE ACETATE AND ETHINYL ESTRADIOL 1.5-30(21)
1 KIT ORAL DAILY
COMMUNITY
Start: 2022-12-28 | End: 2023-12-03

## 2023-12-20 ENCOUNTER — OFFICE VISIT (OUTPATIENT)
Dept: FAMILY MEDICINE CLINIC | Facility: CLINIC | Age: 43
End: 2023-12-20

## 2023-12-20 VITALS
WEIGHT: 180 LBS | HEIGHT: 63.7 IN | RESPIRATION RATE: 16 BRPM | OXYGEN SATURATION: 98 % | HEART RATE: 89 BPM | DIASTOLIC BLOOD PRESSURE: 70 MMHG | BODY MASS INDEX: 31.11 KG/M2 | TEMPERATURE: 98 F | SYSTOLIC BLOOD PRESSURE: 118 MMHG

## 2023-12-20 DIAGNOSIS — Z02.89 EXAMINATION, PHYSICAL, EMPLOYEE: Primary | ICD-10-CM

## 2023-12-20 PROCEDURE — 99386 PREV VISIT NEW AGE 40-64: CPT | Performed by: NURSE PRACTITIONER

## 2023-12-20 RX ORDER — ATORVASTATIN CALCIUM 40 MG/1
40 TABLET, FILM COATED ORAL NIGHTLY
COMMUNITY

## 2024-01-14 ENCOUNTER — OFFICE VISIT (OUTPATIENT)
Dept: FAMILY MEDICINE CLINIC | Facility: CLINIC | Age: 44
End: 2024-01-14
Payer: COMMERCIAL

## 2024-01-14 VITALS
TEMPERATURE: 100 F | DIASTOLIC BLOOD PRESSURE: 74 MMHG | WEIGHT: 180 LBS | HEIGHT: 63 IN | HEART RATE: 100 BPM | SYSTOLIC BLOOD PRESSURE: 133 MMHG | BODY MASS INDEX: 31.89 KG/M2 | RESPIRATION RATE: 20 BRPM | OXYGEN SATURATION: 97 %

## 2024-01-14 DIAGNOSIS — J02.9 SORE THROAT: ICD-10-CM

## 2024-01-14 DIAGNOSIS — J03.90 TONSILLITIS WITH EXUDATE: Primary | ICD-10-CM

## 2024-01-14 LAB
CONTROL LINE PRESENT WITH A CLEAR BACKGROUND (YES/NO): YES YES/NO
KIT LOT #: NORMAL NUMERIC

## 2024-01-14 PROCEDURE — 87081 CULTURE SCREEN ONLY: CPT | Performed by: NURSE PRACTITIONER

## 2024-01-14 RX ORDER — AMOXICILLIN 875 MG/1
875 TABLET, COATED ORAL 2 TIMES DAILY
Qty: 20 TABLET | Refills: 0 | Status: SHIPPED | OUTPATIENT
Start: 2024-01-14 | End: 2024-01-24

## 2024-01-14 NOTE — PROGRESS NOTES
CHIEF COMPLAINT:     Chief Complaint   Patient presents with    Sore Throat     My tonsils are really swollen since Thursday body pain, covid home test neg today         HPI:   Marylou Hanley is a 43 year old female presents to clinic with complaint of sore throat. Patient has had for 3 days. Symptoms have worsened since onset.  Patient reports following associated symptoms: slight nasal congestion, slight cough, but that has been ongoing since having RSV in Nov.  + fever, headache, body aches, decreased appetite, right ear pain with swallowing, tonsils stones.  Has no recent history of strep throat. No sick contacts at home.  Home covid test negative this morning.  Works at high school.  Denies rash.  Treating symptoms with advil and tylenol, last taken 9pm.      Current Outpatient Medications   Medication Sig Dispense Refill    atorvastatin 40 MG Oral Tab Take 1 tablet (40 mg total) by mouth nightly.        Past Medical History:   Diagnosis Date    Hyperlipidemia       Social History:  Social History     Socioeconomic History    Marital status:    Tobacco Use    Smoking status: Never    Smokeless tobacco: Never   Vaping Use    Vaping Use: Never used   Substance and Sexual Activity    Alcohol use: Not Currently    Drug use: Never        REVIEW OF SYSTEMS:   GENERAL HEALTH: feels well otherwise, decreased appetite  SKIN: denies any unusual skin lesions or rashes  HEENT: denies ear pain, See HPI  RESPIRATORY: denies shortness of breath or wheezing  CARDIOVASCULAR: denies chest pain or palpitations   GI: denies vomiting or diarrhea  NEURO: denies dizziness or lightheadedness    EXAM:   /74 (BP Location: Right arm, Patient Position: Sitting, Cuff Size: adult)   Pulse 100   Temp 100.4 °F (38 °C)   Resp 20   Ht 5' 3\" (1.6 m)   Wt 180 lb (81.6 kg)   LMP 01/06/2024 (Exact Date)   SpO2 97%   BMI 31.89 kg/m²   GENERAL: well developed, well nourished,in no apparent distress  SKIN: no rashes,no  suspicious lesions  HEAD: atraumatic, normocephalic. No sinus tenderness.  EYES: conjunctiva clear, EOM intact  EARS: TM's clear, non-injected, no bulging, retraction, or fluid bilaterally  NOSE: nostrils patent, no exudates, nasal mucosa pink and noninflamed  THROAT: oral mucosa pink, moist. Posterior pharynx erythematous and injected. ++ exudates. Tonsils 2/4.  Breath is not malodorous.  No trismus, hoarseness, muffled voice, stridor, drooling or uvular deviation.    NECK: supple, non-tender  LUNGS: clear to auscultation bilaterally, no wheezes or rhonchi. Breathing is non labored.  CARDIO: RRR without murmur  LYMPH: + tender anterior cervical. no submandibular LAD.  No posterior cervical or occipital LAD.  NEURO:  No focal deficits    Recent Results (from the past 24 hour(s))   Rapid Strep    Collection Time: 01/14/24  1:31 PM   Result Value Ref Range    Strep Grp A Screen Neg Negative    Control Line Present with a clear background (yes/no) Yes Yes/No    Kit Lot # [id number] Numeric    Kit Expiration Date 4/22/2025 Date         ASSESSMENT AND PLAN:     ASSESSMENT:  Encounter Diagnoses   Name Primary?    Tonsillitis with exudate Yes    Sore throat        PLAN:   Will treat with antibiotics.  + fever, + lad, + exudate.  Will send throat culture.  Comfort care as listed in patient instructions.   Medication as below.    Requested Prescriptions     Signed Prescriptions Disp Refills    amoxicillin 875 MG Oral Tab 20 tablet 0     Sig: Take 1 tablet (875 mg total) by mouth 2 (two) times daily for 10 days.       Risks, benefits, side effects of medication explained and discussed.     Follow up in 3-5 days if not improving, condition worsens, or fever greater than or equal to 100.4 persists for 72 hours.      The patient/parent indicates understanding of these issues and agrees to the plan.      Patient Instructions     If we send out a throat culture, we will contact you with the results in 48-72 hours. If positive, then  we will call in an appropriate antibiotic. If negative, then the sore throat is most likely viral in origin and should resolve within 7-10 days.     Comfort measures explained and discussed:    OTC Tylenol/ibuprofen as needed.    Push fluids- warm or cool liquids, whichever is soothing for patient.     Avoid caffeine.    Do not share utensils or drinks with anyone.    Good handwashing.    Get plenty of rest.    Can use over the counter benzocaine such as Cepacol throat lozenges or Chloroseptic throat spray to soothe sore throat.    Warm salt water gargles 2-3 times daily for at least 3 days.      If strep test is results are positive:    Take the full course of your antibiotic even if you are feeling better.   You are considered to be contagious until you have been on antibiotics for 24 hours.   You can return to school and/or work once on antibiotics for 24 hours  Change tooth brush two days into therapy  Follow up in 3-5 days if not improving, condition worsens, or fever greater than or equal to 100.4 persists for 72 hours.  Follow up in 3-5 days if not improving, condition worsens, or fever greater than or equal to 100.4 persists for 72 hours.

## 2024-02-06 ENCOUNTER — OFFICE VISIT (OUTPATIENT)
Dept: PODIATRY CLINIC | Facility: CLINIC | Age: 44
End: 2024-02-06
Payer: COMMERCIAL

## 2024-02-06 DIAGNOSIS — M20.12 VALGUS DEFORMITY OF BOTH GREAT TOES: ICD-10-CM

## 2024-02-06 DIAGNOSIS — M20.11 VALGUS DEFORMITY OF BOTH GREAT TOES: ICD-10-CM

## 2024-02-06 DIAGNOSIS — R52 PAIN: Primary | ICD-10-CM

## 2024-02-06 PROCEDURE — 99203 OFFICE O/P NEW LOW 30 MIN: CPT | Performed by: STUDENT IN AN ORGANIZED HEALTH CARE EDUCATION/TRAINING PROGRAM

## 2024-02-06 NOTE — PROGRESS NOTES
[facility] Podiatry  Progress Note      Marylou Hanley is a 43 year old female.   Chief Complaint   Patient presents with    Consult     Foot pain-Bialteral- Patient states that she has pain in the top of the left foot and right hallux. States that she has pain for the last few months. Denies use of oral medications for pain. Rates pain 6/10 in the left foot and 3/10 in the right.              HPI:     Pt is a pleasant 43 year old female who PTC for vijaya foot pain evaluation. Admits to pain to vijaya first metatarsals. Pain started few months ago. Denies any treatment. Admits that left is worse than right.        Allergies: Patient has no known allergies.    Current Outpatient Medications   Medication Sig Dispense Refill    atorvastatin 40 MG Oral Tab Take 1 tablet (40 mg total) by mouth nightly.        Past Medical History:   Diagnosis Date    Hyperlipidemia       Past Surgical History:   Procedure Laterality Date          CHOLECYSTECTOMY      MAGDI LOCALIZATION WIRE 1 SITE LEFT (CPT=19281) Left      benign    NEEDLE BIOPSY RIGHT Right 2019      Family History   Problem Relation Age of Onset    Breast Cancer Maternal Aunt 56    Breast Cancer Maternal Cousin Female 33    Ovarian Cancer Neg       Social History     Socioeconomic History    Marital status:    Tobacco Use    Smoking status: Never    Smokeless tobacco: Never   Vaping Use    Vaping Use: Never used   Substance and Sexual Activity    Alcohol use: Not Currently    Drug use: Never           REVIEW OF SYSTEMS:     Denies nause, fever, chills  No calf pain  Denies chest pain or SOB      EXAM:   LMP 2024 (Exact Date)   GENERAL: well developed, well nourished, in no apparent distress  EXTREMITIES:   1. Integument: Normal skin temperature and turgor  2. Vascular: Dorsalis pedis two out of four bilateral and posterior tibial pulses two out of   four bilateral, capillary refill normal.   3. Musculoskeletal: All muscle groups are  graded 5 out of 5 in the foot and ankle. Lateral deviation of vijaya hallux with prominent first metatarsal medial eminence.    4. Neurological: Normal sharp dull sensation; reflexes normal.      XR FOOT, COMPLETE (MIN 3 VIEWS), LEFT (CPT=73630)    Result Date: 2/10/2024  CONCLUSION:  Suggestion of mild hallux valgus with bunion formation.  Mild first metatarsophalangeal joint osteoarthritis.  Suggestion of mild flattening of the second metatarsal head, which may be seen as sequela of Freiberg's infraction.        Dictated by (CST): Luzmaria Fishman MD on 2/10/2024 at 10:43 AM     Finalized by (CST): Luzmaria Fishman MD on 2/10/2024 at 10:46 AM          XR FOOT, COMPLETE (MIN 3 VIEWS), RIGHT (CPT=73630)    Result Date: 2/10/2024  CONCLUSION:  Mild to moderate first metatarsophalangeal joint osteoarthritis.  Suggestion of mild hallux valgus on nonweightbearing radiographs.  Mild to moderate hallux sesamoid complex osteoarthritis.    Dictated by (CST): Luzmaria Fishman MD on 2/10/2024 at 10:42 AM     Finalized by (CST): Luzmaria Fishman MD on 2/10/2024 at 10:43 AM            ASSESSMENT AND PLAN:   Diagnoses and all orders for this visit:    Pain  -     XR FOOT, COMPLETE (MIN 3 VIEWS), LEFT (CPT=73630); Future  -     XR FOOT, COMPLETE (MIN 3 VIEWS), RIGHT (CPT=73630); Future    Valgus deformity of both great toes        Plan:       Pt seen and examined. Findings discussed with patient.  Discussed etiology of condition along with treatment options  Order placed for vijaya foot xrays  RTC to go over vijaya foot xray results         The patient indicates understanding of these issues and agrees to the plan.        Nidia Zapata DPM

## 2024-02-10 ENCOUNTER — HOSPITAL ENCOUNTER (OUTPATIENT)
Dept: GENERAL RADIOLOGY | Facility: HOSPITAL | Age: 44
Discharge: HOME | End: 2024-02-10
Attending: STUDENT IN AN ORGANIZED HEALTH CARE EDUCATION/TRAINING PROGRAM
Payer: COMMERCIAL

## 2024-02-10 DIAGNOSIS — R52 PAIN: ICD-10-CM

## 2024-02-10 PROCEDURE — 73630 X-RAY EXAM OF FOOT: CPT | Performed by: STUDENT IN AN ORGANIZED HEALTH CARE EDUCATION/TRAINING PROGRAM

## 2024-02-27 ENCOUNTER — OFFICE VISIT (OUTPATIENT)
Dept: PODIATRY CLINIC | Facility: CLINIC | Age: 44
End: 2024-02-27
Payer: COMMERCIAL

## 2024-02-27 DIAGNOSIS — M20.12 VALGUS DEFORMITY OF BOTH GREAT TOES: ICD-10-CM

## 2024-02-27 DIAGNOSIS — R52 PAIN: Primary | ICD-10-CM

## 2024-02-27 DIAGNOSIS — M20.11 VALGUS DEFORMITY OF BOTH GREAT TOES: ICD-10-CM

## 2024-02-27 PROCEDURE — 99213 OFFICE O/P EST LOW 20 MIN: CPT | Performed by: STUDENT IN AN ORGANIZED HEALTH CARE EDUCATION/TRAINING PROGRAM

## 2024-02-28 NOTE — PROGRESS NOTES
[facility] Podiatry  Progress Note      Marylou Hanley is a 43 year old female.   Chief Complaint   Patient presents with    Foot Pain     3 week follow up- here to go over xray results on bilateral feet. Denies pain.              HPI:     Pt is a pleasant 43 year old female who PTC to discuss vijaya foot xrays. Pt also admits that modifying her shoelaces has provided her with relief.       Allergies: Patient has no known allergies.    Current Outpatient Medications   Medication Sig Dispense Refill    atorvastatin 40 MG Oral Tab Take 1 tablet (40 mg total) by mouth nightly.        Past Medical History:   Diagnosis Date    Hyperlipidemia       Past Surgical History:   Procedure Laterality Date          CHOLECYSTECTOMY      MAGDI LOCALIZATION WIRE 1 SITE LEFT (CPT=19281) Left      benign    NEEDLE BIOPSY RIGHT Right 2019      Family History   Problem Relation Age of Onset    Breast Cancer Maternal Aunt 56    Breast Cancer Maternal Cousin Female 33    Ovarian Cancer Neg       Social History     Socioeconomic History    Marital status:    Tobacco Use    Smoking status: Never    Smokeless tobacco: Never   Vaping Use    Vaping Use: Never used   Substance and Sexual Activity    Alcohol use: Not Currently    Drug use: Never           REVIEW OF SYSTEMS:     Denies nause, fever, chills  No calf pain  Denies chest pain or SOB      EXAM:   LMP 2024 (Exact Date)   GENERAL: well developed, well nourished, in no apparent distress  EXTREMITIES:   1. Integument: Normal skin temperature and turgor  2. Vascular: Dorsalis pedis two out of four bilateral and posterior tibial pulses two out of   four bilateral, capillary refill normal.   3. Musculoskeletal: All muscle groups are graded 5 out of 5 in the foot and ankle. Lateral deviation of vijaya hallux with prominent first metatarsal medial eminence.    4. Neurological: Normal sharp dull sensation; reflexes normal.             ASSESSMENT AND PLAN:    Diagnoses and all orders for this visit:    Pain    Valgus deformity of both great toes        Plan:     Pt seen and examined. Findings discussed with pt  Reviewed vijaya foot xrays with patient in great detail  Discussed that there is vijaya 1st MPJ OA. Discussed conservative treatment which includes supportive shoes with mesh material or possible cortisone injection to MPJ.   RTC as needed    The patient indicates understanding of these issues and agrees to the plan.        Nidia Zapata DPM

## 2024-03-25 ENCOUNTER — TELEPHONE (OUTPATIENT)
Dept: OBGYN CLINIC | Facility: CLINIC | Age: 44
End: 2024-03-25

## 2024-03-25 NOTE — TELEPHONE ENCOUNTER
Pt messaging to follow-up on 2/21 [employer] messaging. This was sent to ANA x 2 without response. Pt with long, heavy cycles, requesting to restart Junel 1.5/30.     Pt calling today to report onset of menses. Changing a light pad every 2 hours or so. Cramping rated 7/10, making her nauseous. She is lying in bed now. She states she does not like taking pills, so has not taken anything for pain.     Pt advised typically we send to ER with severe pain, but would recommend OTC pain med to see if pain improves. Dosing given for tylenol 1000 mg every 8 hours prn or motrin 600 mg every 6-8 hrs prn.   If pain not responding, needs ER. Bleeding precautions reviewed.     Pt notified I will place message on ANA's desk for review upon return to office 4/1.

## 2024-03-25 NOTE — TELEPHONE ENCOUNTER
Pt called to follow up on birth control and mychart messages of 2/27. Pt is in pain and nauseous. Please call.

## 2024-05-06 ENCOUNTER — TELEPHONE (OUTPATIENT)
Dept: FAMILY MEDICINE CLINIC | Facility: CLINIC | Age: 44
End: 2024-05-06

## 2024-05-06 DIAGNOSIS — Z12.11 COLON CANCER SCREENING: Primary | ICD-10-CM

## 2024-05-06 NOTE — TELEPHONE ENCOUNTER
Assumed care on patient post bedside report. Verified heparin drip with day RN. Alert and oriented x 4. Respiration even and unlabored. Vss. Denies pain. Confirmed no BM for day shift.  Tele on and heart rhythm and rate checked on monitor.    Patient aware of plan of care. Questions answered. Monitor v/s, q 2 turn, check lab values, manage pain and maintain safety.    Safety precautions in place are side rails up x 2, bed to lowest level, alarms on. Appropriate to use call light for needs and assistance. Will round hourly and as needed.      Patient sent a message on [employer] requesting the following; please advise.    Colorectal Cancer Screening

## 2024-09-11 ENCOUNTER — OFFICE VISIT (OUTPATIENT)
Dept: FAMILY MEDICINE CLINIC | Facility: CLINIC | Age: 44
End: 2024-09-11
Payer: COMMERCIAL

## 2024-09-11 VITALS
RESPIRATION RATE: 18 BRPM | HEART RATE: 110 BPM | DIASTOLIC BLOOD PRESSURE: 76 MMHG | HEIGHT: 63 IN | BODY MASS INDEX: 32.07 KG/M2 | OXYGEN SATURATION: 95 % | WEIGHT: 181 LBS | TEMPERATURE: 99 F | SYSTOLIC BLOOD PRESSURE: 135 MMHG

## 2024-09-11 DIAGNOSIS — N39.0 ACUTE UTI: Primary | ICD-10-CM

## 2024-09-11 DIAGNOSIS — R39.9 UTI SYMPTOMS: ICD-10-CM

## 2024-09-11 LAB
BILIRUBIN: NEGATIVE
GLUCOSE (URINE DIPSTICK): NEGATIVE MG/DL
KETONES (URINE DIPSTICK): NEGATIVE MG/DL
MULTISTIX LOT#: ABNORMAL NUMERIC
NITRITE, URINE: POSITIVE
PH, URINE: 6 (ref 4.5–8)
PROTEIN (URINE DIPSTICK): >=300 MG/DL
SPECIFIC GRAVITY: 1.03 (ref 1–1.03)
URINE-COLOR: YELLOW
UROBILINOGEN,SEMI-QN: 1 MG/DL (ref 0–1.9)

## 2024-09-11 PROCEDURE — 3078F DIAST BP <80 MM HG: CPT

## 2024-09-11 PROCEDURE — 99213 OFFICE O/P EST LOW 20 MIN: CPT

## 2024-09-11 PROCEDURE — 87088 URINE BACTERIA CULTURE: CPT

## 2024-09-11 PROCEDURE — 81003 URINALYSIS AUTO W/O SCOPE: CPT

## 2024-09-11 PROCEDURE — 3008F BODY MASS INDEX DOCD: CPT

## 2024-09-11 PROCEDURE — 87186 SC STD MICRODIL/AGAR DIL: CPT

## 2024-09-11 PROCEDURE — 87086 URINE CULTURE/COLONY COUNT: CPT

## 2024-09-11 PROCEDURE — 3075F SYST BP GE 130 - 139MM HG: CPT

## 2024-09-11 RX ORDER — NITROFURANTOIN 25; 75 MG/1; MG/1
100 CAPSULE ORAL 2 TIMES DAILY
Qty: 10 CAPSULE | Refills: 0 | Status: SHIPPED | OUTPATIENT
Start: 2024-09-11 | End: 2024-09-16

## 2024-09-11 NOTE — PROGRESS NOTES
CHIEF COMPLAINT:     Chief Complaint   Patient presents with    Urinary Symptoms              HPI:   Marylou Hanley is a 44 year old female who presents with symptoms of UTI. Patient reporting symptoms of dysuria and urinary frequency today.  Symptoms have been persistent since onset.  Treatments tried: nothing prior to arrival.  Associated symptoms: suprapubic pressure.  Patient denies flank pain, fever, hematuria, nausea, or vomiting.       Current Outpatient Medications   Medication Sig Dispense Refill    nitrofurantoin monohydrate macro 100 MG Oral Cap Take 1 capsule (100 mg total) by mouth 2 (two) times daily for 5 days. 10 capsule 0    atorvastatin 40 MG Oral Tab Take 1 tablet (40 mg total) by mouth nightly.        Past Medical History:    Hyperlipidemia      Social History:  Social History     Socioeconomic History    Marital status:    Tobacco Use    Smoking status: Never    Smokeless tobacco: Never   Vaping Use    Vaping status: Never Used   Substance and Sexual Activity    Alcohol use: Not Currently    Drug use: Never         REVIEW OF SYSTEMS:   GENERAL: See above  GI: See HPI.    : See HPI.      EXAM:   /76 (BP Location: Left arm, Patient Position: Sitting, Cuff Size: adult)   Pulse 110   Temp 98.8 °F (37.1 °C)   Resp 18   Ht 5' 3\" (1.6 m)   Wt 181 lb (82.1 kg)   LMP 09/03/2024 (Exact Date)   SpO2 95%   BMI 32.06 kg/m²   Physical Exam  Constitutional:       Appearance: Normal appearance.   HENT:      Head: Normocephalic and atraumatic.      Nose: Nose normal.      Mouth/Throat:      Mouth: Mucous membranes are moist.   Eyes:      Conjunctiva/sclera: Conjunctivae normal.   Cardiovascular:      Rate and Rhythm: Normal rate.   Pulmonary:      Effort: Pulmonary effort is normal. No respiratory distress.   Abdominal:      General: Abdomen is flat. There is no distension.      Palpations: Abdomen is soft.      Tenderness: There is no abdominal tenderness. There is no right CVA  tenderness or left CVA tenderness.   Musculoskeletal:         General: Normal range of motion.      Cervical back: Normal range of motion.   Skin:     General: Skin is warm and dry.   Neurological:      General: No focal deficit present.      Mental Status: She is alert and oriented to person, place, and time.   Psychiatric:         Mood and Affect: Mood normal.         Behavior: Behavior normal.         Recent Results (from the past 24 hour(s))   URINALYSIS, AUTO, W/O SCOPE    Collection Time: 09/11/24  3:20 PM   Result Value Ref Range    Glucose Urine Negative Negative mg/dL    Bilirubin Urine Negative Negative    Ketones, UA Negative Negative - Trace mg/dL    Spec Gravity 1.030 1.005 - 1.030    Blood Urine Moderate (A) Negative    PH Urine 6.0 5.0 - 8.0    Protein Urine >=300 (A) Negative - Trace mg/dL    Urobilinogen Urine 1.0 0.2 - 1.0 mg/dL    Nitrite Urine Positive (A) Negative    Leukocyte Esterase Urine Small (A) Negative    APPEARANCE cloudy Clear    Color Urine yellow Yellow    Multistix Lot# [id number] Numeric    Multistix Expiration Date 5/31/2025 Date         ASSESSMENT AND PLAN:   Marylou Hanley is a 44 year old female presents with urinary symptoms.    ASSESSMENT:  Encounter Diagnoses   Name Primary?    UTI symptoms     Acute UTI Yes         Orders Placed This Encounter   Procedures    URINALYSIS, AUTO, W/O SCOPE    Urine Culture, Routine         PLAN: Urine dip positive for leukocytes, nitrates, protein, and blood. Urine culture collected and sent to lab; results pending. Meds as listed below. Risk and benefits of medication discussed. Stressed importance of completing full course of antibiotic, unless told otherwise.  The patient is asked to see PCP in 3 days if not better. Seek care immediately for new onset of fever, vomiting, worsening symptoms. The patient indicates understanding of these issues and agrees to the plan.      Meds & Refills for this Visit:  Requested Prescriptions     Signed  Prescriptions Disp Refills    nitrofurantoin monohydrate macro 100 MG Oral Cap 10 capsule 0     Sig: Take 1 capsule (100 mg total) by mouth 2 (two) times daily for 5 days.

## 2024-10-02 ENCOUNTER — OFFICE VISIT (OUTPATIENT)
Dept: OBGYN CLINIC | Facility: CLINIC | Age: 44
End: 2024-10-02
Payer: COMMERCIAL

## 2024-10-02 ENCOUNTER — TELEPHONE (OUTPATIENT)
Dept: OBGYN CLINIC | Facility: CLINIC | Age: 44
End: 2024-10-02

## 2024-10-02 VITALS
WEIGHT: 186.38 LBS | DIASTOLIC BLOOD PRESSURE: 87 MMHG | SYSTOLIC BLOOD PRESSURE: 128 MMHG | HEART RATE: 92 BPM | BODY MASS INDEX: 33 KG/M2

## 2024-10-02 DIAGNOSIS — N39.3 SUI (STRESS URINARY INCONTINENCE, FEMALE): ICD-10-CM

## 2024-10-02 DIAGNOSIS — N39.3 SUI (STRESS URINARY INCONTINENCE, FEMALE): Primary | ICD-10-CM

## 2024-10-02 DIAGNOSIS — N92.0 MENORRHAGIA WITH REGULAR CYCLE: Primary | ICD-10-CM

## 2024-10-02 DIAGNOSIS — Z12.31 SCREENING MAMMOGRAM FOR BREAST CANCER: ICD-10-CM

## 2024-10-02 PROCEDURE — 3079F DIAST BP 80-89 MM HG: CPT | Performed by: OBSTETRICS & GYNECOLOGY

## 2024-10-02 PROCEDURE — 99396 PREV VISIT EST AGE 40-64: CPT | Performed by: OBSTETRICS & GYNECOLOGY

## 2024-10-02 PROCEDURE — 3074F SYST BP LT 130 MM HG: CPT | Performed by: OBSTETRICS & GYNECOLOGY

## 2024-10-02 NOTE — PROGRESS NOTES
Subjective:   Patient ID: Marylou Hanley is a 44 year old female.    HPI   and regular but heavy menses. TL for BC. Remainder of ROS significant for MITCH.     History/Other:   Review of Systems   Constitutional:  Negative for appetite change, fatigue and unexpected weight change.   Eyes:  Negative for visual disturbance.   Respiratory:  Negative for cough and shortness of breath.    Cardiovascular:  Negative for chest pain, palpitations and leg swelling.   Gastrointestinal:  Negative for abdominal distention, abdominal pain, blood in stool, constipation and diarrhea.   Genitourinary:  Positive for menstrual problem. Negative for dyspareunia, dysuria, frequency, pelvic pain and urgency.   Musculoskeletal:  Negative for arthralgias and myalgias.   Skin:  Negative for rash.   Neurological:  Negative for weakness, numbness and headaches.   Psychiatric/Behavioral:  Negative for dysphoric mood. The patient is not nervous/anxious.      Current Outpatient Medications   Medication Sig Dispense Refill    atorvastatin 40 MG Oral Tab Take 1 tablet (40 mg total) by mouth nightly.       Allergies:No Known Allergies    Objective:   Physical Exam  Constitutional:       General: She is not in acute distress.     Appearance: She is well-developed. She is not diaphoretic.   Neck:      Thyroid: No thyromegaly.   Cardiovascular:      Rate and Rhythm: Normal rate and regular rhythm.      Heart sounds: Normal heart sounds. No murmur heard.  Pulmonary:      Effort: Pulmonary effort is normal.      Breath sounds: Normal breath sounds. No wheezing or rales.   Chest:   Breasts:     Right: Normal. No mass, nipple discharge, skin change or tenderness.      Left: Normal. No mass, nipple discharge, skin change or tenderness.      Comments:     Abdominal:      General: There is no distension.      Palpations: Abdomen is soft. There is no mass.      Tenderness: There is no abdominal tenderness. There is no guarding or rebound.    Genitourinary:     Labia:         Right: No rash or lesion.         Left: No rash or lesion.       Vagina: Normal. No vaginal discharge.      Cervix: No cervical motion tenderness or discharge.      Uterus: Not enlarged and not tender.       Adnexa:         Right: No mass, tenderness or fullness.          Left: No mass, tenderness or fullness.     Musculoskeletal:         General: No tenderness.      Cervical back: Neck supple.   Lymphadenopathy:      Cervical: No cervical adenopathy.      Upper Body:      Right upper body: No supraclavicular, axillary or pectoral adenopathy.      Left upper body: No supraclavicular, axillary or pectoral adenopathy.   Neurological:      Mental Status: She is alert.         Assessment & Plan:   1. Menorrhagia with regular cycle    2. Screening mammogram for breast cancer    3. MITCH (stress urinary incontinence, female)        No orders of the defined types were placed in this encounter.    PLAN: OK for PFPT referral. If no help then Uro-gyne referral.     Meds This Visit:  Requested Prescriptions      No prescriptions requested or ordered in this encounter       Imaging & Referrals:  [facility] YULISSA 2D+3D SCREENING BILAT (CPT=77067/40282)

## 2024-11-22 ENCOUNTER — OFFICE VISIT (OUTPATIENT)
Dept: OBGYN CLINIC | Facility: CLINIC | Age: 44
End: 2024-11-22
Payer: COMMERCIAL

## 2024-11-22 ENCOUNTER — HOSPITAL ENCOUNTER (OUTPATIENT)
Dept: MAMMOGRAPHY | Facility: HOSPITAL | Age: 44
Discharge: HOME | End: 2024-11-22
Attending: OBSTETRICS & GYNECOLOGY
Payer: COMMERCIAL

## 2024-11-22 VITALS
SYSTOLIC BLOOD PRESSURE: 119 MMHG | HEART RATE: 96 BPM | DIASTOLIC BLOOD PRESSURE: 76 MMHG | WEIGHT: 189.38 LBS | BODY MASS INDEX: 34 KG/M2

## 2024-11-22 DIAGNOSIS — L02.31 CUTANEOUS ABSCESS OF BUTTOCK: Primary | ICD-10-CM

## 2024-11-22 DIAGNOSIS — Z12.4 SCREENING FOR MALIGNANT NEOPLASM OF CERVIX: ICD-10-CM

## 2024-11-22 DIAGNOSIS — Z12.4 SCREENING FOR CERVICAL CANCER: ICD-10-CM

## 2024-11-22 DIAGNOSIS — Z12.31 SCREENING MAMMOGRAM FOR BREAST CANCER: ICD-10-CM

## 2024-11-22 PROCEDURE — 3074F SYST BP LT 130 MM HG: CPT | Performed by: OBSTETRICS & GYNECOLOGY

## 2024-11-22 PROCEDURE — 77063 BREAST TOMOSYNTHESIS BI: CPT | Performed by: OBSTETRICS & GYNECOLOGY

## 2024-11-22 PROCEDURE — 99213 OFFICE O/P EST LOW 20 MIN: CPT | Performed by: OBSTETRICS & GYNECOLOGY

## 2024-11-22 PROCEDURE — 3078F DIAST BP <80 MM HG: CPT | Performed by: OBSTETRICS & GYNECOLOGY

## 2024-11-22 PROCEDURE — 77067 SCR MAMMO BI INCL CAD: CPT | Performed by: OBSTETRICS & GYNECOLOGY

## 2024-11-22 RX ORDER — NORETHINDRONE ACETATE AND ETHINYL ESTRADIOL 1.5-30(21)
1 KIT ORAL DAILY
COMMUNITY
End: 2024-11-22

## 2024-11-22 RX ORDER — NORETHINDRONE ACETATE AND ETHINYL ESTRADIOL 1.5-30(21)
1 KIT ORAL DAILY
Qty: 84 TABLET | Refills: 3 | Status: SHIPPED | OUTPATIENT
Start: 2024-11-22

## 2024-11-25 LAB — HPV E6+E7 MRNA CVX QL NAA+PROBE: NEGATIVE

## 2024-12-04 PROBLEM — L02.31 CUTANEOUS ABSCESS OF BUTTOCK: Status: ACTIVE | Noted: 2024-12-04

## 2024-12-05 NOTE — PROGRESS NOTES
[facility], [address]  [phone]  [email or web address]    Gastroenterology follow up-Progress note    Impression:  1. Elevated liver biochemistries (improving): unclear whether inflammatory response in setting of COVID-19 infection v ischemic/low perfusion/thrombosis              - ; ; Alk phos 290 (trending down)              - negative viral hep panel 12/31/21; unenhanced CTA chest without hepatobiliary pathology   - Abd US 12/31/21 without liver lesions but noted that portal vein not well assessed otherwise no findings suggestive of cholecystitis or acute liver disease.   - hepatic doppler 1/4/22 without evidence of thrombus   2. Hyperbilirubinemia - likely a sequela of patient's disease state               - Tbili 11.4 (increased)  3. Coagulopathy              - INR 2.1  4. Abnormal chest CT finding of free air in upper abdomen  5. Pneumonia due to COVID-19 virus               - confirmed 12/31/21  6. Acute hypoxic respiratory failure requiring supplemental O2  7. Acute on chronic systolic heart failure  8. Mild bilateral pleural effusions  9. CAD s/p CABG x 3 on 8/5/2021  10. Elevated d-Dimer @ 7.69  11. Thrombocytopenia - plt 101k (improved)  12. Encephalopathy  13. Leukocytosis      Plan:  1. Best supportive care as established as liver biochemistries are improving. No indication for liver biopsy. 2. Continue to monitor  3. Medical management otherwise per primary team      Will be available as needed. Please call with questions/concerns. Subjective:  No acute GI events overnight. Patient Active Problem List   Diagnosis Code    Gross hematuria R31.0    Elevated PSA R97.20    Prediabetes R73.03    Insomnia G47.00    Joint pain M25.50    Anxiety F41.9    Anxiety and depression F41.9, F32. A    Kidney stone N20.0    Malaria B54    Skin cancer C44.90    Gout M10.9    Essential hypertension I10    Pure hypercholesterolemia E78.00    Subjective:   Patient ID: Marylou Hanley is a 44 year old female.    HPI   scheduled to day for pap as we could not do it at time of annual visit. She has additional complaint today of boil on right buttock area.     History/Other:   Review of Systems   Constitutional:  Negative for appetite change, fatigue and unexpected weight change.   Eyes:  Negative for visual disturbance.   Respiratory:  Negative for cough and shortness of breath.    Cardiovascular:  Negative for chest pain, palpitations and leg swelling.   Gastrointestinal:  Negative for abdominal distention, abdominal pain, blood in stool, constipation and diarrhea.   Genitourinary:  Negative for dyspareunia, dysuria, frequency, menstrual problem, pelvic pain and urgency.   Musculoskeletal:  Negative for arthralgias and myalgias.   Skin:  Negative for rash.   Neurological:  Negative for weakness, numbness and headaches.   Psychiatric/Behavioral:  Negative for dysphoric mood. The patient is not nervous/anxious.      Current Outpatient Medications   Medication Sig Dispense Refill    Norethin Ace-Eth Estrad-FE (TONY FE ) 1.5-30 MG-MCG Oral Tab Take 1 tablet by mouth daily. 84 tablet 3    atorvastatin 40 MG Oral Tab Take 1 tablet (40 mg total) by mouth nightly.       Allergies:Allergies[1]    Objective:   Physical Exam  Genitourinary:         Comments: Healing follicular abscess right buttock. No current drainage, mass or fluctuance. EG, vagina and cervix w/o lesions. Uterus NSSC and no adnexal mass / tenderness. Co-test done.             Assessment & Plan:   1. Cutaneous abscess of buttock    2. Screening for malignant neoplasm of cervix    3. Screening for cervical cancer        Orders Placed This Encounter   Procedures    Hpv Dna  High Risk , Thin Prep Collect    ThinPrep PAP Smear    THINPREP PAP SMEAR ONLY     Local care instructions for abscess. Guidance given.   Meds This Visit:  Requested Prescriptions     Signed Prescriptions Disp  Refills    Norethin Ace-Eth Estrad-FE (TONY DICK 1.5/30) 1.5-30 MG-MCG Oral Tab 84 tablet 3     Sig: Take 1 tablet by mouth daily.       Imaging & Referrals:  None         [1] No Known Allergies     Sleeping difficulty G47.9    Mild episode of recurrent major depressive disorder ([facility]) F33.0    Cerebral microvascular disease I67.89    Benign prostatic hyperplasia with lower urinary tract symptoms N40.1    CAD (coronary artery disease) I25.10    S/P CABG x 3 Z95.1    CHF (congestive heart failure) ([facility]) I50.9    SOB (shortness of breath) R06.02    Transaminitis R74.01    Pneumonia due to COVID-19 virus U07.1, J12.82         Visit Vitals  BP (!) 136/93 (BP 1 Location: Right upper arm)   Pulse 83   Temp 97.7 °F (36.5 °C)   Resp 20   Ht 5' 8\" (1.727 m)   Wt 72.7 kg (160 lb 4.8 oz)   SpO2 92%   BMI 24.37 kg/m²     Patient in isolation due to COVID-19 infection.  Observed through room glass window  General: Restless    Cardiovascular: normal rate    Pulmonary/Chest Wall: O2 via nasal cannula           Intake/Output Summary (Last 24 hours) at 1/5/2022 0758  Last data filed at 1/5/2022 0540  Gross per 24 hour   Intake 160 ml   Output    Net 160 ml       CBC w/Diff    Lab Results   Component Value Date/Time    WBC 15.8 (H) 01/05/2022 05:42 AM    RBC 4.22 (L) 01/05/2022 05:42 AM    HGB 13.7 01/05/2022 05:42 AM    HCT 42.5 01/05/2022 05:42 AM    .7 (H) 01/05/2022 05:42 AM    MCH 32.5 01/05/2022 05:42 AM    MCHC 32.2 01/05/2022 05:42 AM    RDW 19.9 (H) 01/05/2022 05:42 AM     (L) 01/05/2022 05:42 AM    Lab Results   Component Value Date/Time    GRANS 88 (H) 12/31/2021 06:30 PM    LYMPH 5 (L) 12/31/2021 06:30 PM    EOS 0 12/31/2021 06:30 PM    BASOS 0 12/31/2021 06:30 PM      Coagulation/Chemistry   Lab Results   Component Value Date/Time    INR 2.1 (H) 01/05/2022 05:42 AM    INR 2.4 (H) 01/04/2022 01:15 PM    INR 2.9 (H) 01/01/2022 04:46 AM    Prothrombin time 23.3 (H) 01/05/2022 05:42 AM    Prothrombin time 26.2 (H) 01/04/2022 01:15 PM    Prothrombin time 30.1 (H) 01/01/2022 04:46 AM      Lab Results   Component Value Date/Time    Sodium 150 (H) 01/05/2022 05:42 AM    Potassium 5.1 01/05/2022 05:42 AM    Chloride 119 (H) 01/05/2022 05:42 AM    CO2 23 01/05/2022 05:42 AM    Anion gap 8 01/05/2022 05:42 AM    Glucose 154 (H) 01/05/2022 05:42 AM    BUN 84 (H) 01/05/2022 05:42 AM    Creatinine 1.36 (H) 01/05/2022 05:42 AM    BUN/Creatinine ratio 62 (H) 01/05/2022 05:42 AM    GFR est AA >60 01/05/2022 05:42 AM    GFR est non-AA 51 (L) 01/05/2022 05:42 AM    Calcium 8.5 01/05/2022 05:42 AM    Bilirubin, total 11.4 (H) 01/05/2022 05:42 AM    ALT (SGPT) 735 (H) 01/05/2022 05:42 AM    Alk. phosphatase 290 (H) 01/05/2022 05:42 AM    Protein, total 6.5 01/05/2022 05:42 AM    Albumin 3.0 (L) 01/05/2022 05:42 AM    Globulin 3.5 01/05/2022 05:42 AM    A-G Ratio 0.9 01/05/2022 05:42 AM         Radiology  Hepatic Doppler Results (1/4/22): Interpretation Summary    · Technically difficult exam due to patient movement throughout exam.  · The portal vein and inferior vena cava are patent without evidence of thrombus. · The portal vein has hepatopetal flow. · Hepatofugal flow seen within the hepatic vein  · The main portal, right portal, left portal, right hepatic, left hepatic, middle hepatic, superior mesenteric, splenic and IVC vein(s) shows signs pulsatile flow. RUQ US 12/31/21:  IMPRESSION     Gallbladder mild wall thickening, but without additional findings to suggest  cholecystitis, possibly due to volume overload or hepatocellular disease.     Increased hepatic echotexture suggest nonspecific hepatocellular disease as  steatosis. Portal vein flow not well assessed, possibly abnormal as described  above. ALLISON Palma    [facility].   [email or web address]  Phone: [phone]  Pager: [phone]

## 2024-12-06 ENCOUNTER — HOSPITAL ENCOUNTER (OUTPATIENT)
Dept: MAMMOGRAPHY | Facility: HOSPITAL | Age: 44
Discharge: HOME | End: 2024-12-06
Attending: OBSTETRICS & GYNECOLOGY
Payer: COMMERCIAL

## 2024-12-06 ENCOUNTER — HOSPITAL ENCOUNTER (OUTPATIENT)
Dept: ULTRASOUND IMAGING | Facility: HOSPITAL | Age: 44
Discharge: HOME | End: 2024-12-06
Attending: OBSTETRICS & GYNECOLOGY
Payer: COMMERCIAL

## 2024-12-06 DIAGNOSIS — R92.8 ABNORMAL MAMMOGRAM: ICD-10-CM

## 2024-12-06 PROCEDURE — 77061 BREAST TOMOSYNTHESIS UNI: CPT | Performed by: OBSTETRICS & GYNECOLOGY

## 2024-12-06 PROCEDURE — 76642 ULTRASOUND BREAST LIMITED: CPT | Performed by: OBSTETRICS & GYNECOLOGY

## 2024-12-06 PROCEDURE — 77065 DX MAMMO INCL CAD UNI: CPT | Performed by: OBSTETRICS & GYNECOLOGY

## 2024-12-23 RX ORDER — NORETHINDRONE ACETATE AND ETHINYL ESTRADIOL 1.5-30(21)
1 KIT ORAL DAILY
Qty: 84 TABLET | Refills: 3 | OUTPATIENT
Start: 2024-12-23

## 2025-05-29 RX ORDER — NORETHINDRONE ACETATE AND ETHINYL ESTRADIOL 1.5-30(21)
1 KIT ORAL DAILY
Qty: 84 TABLET | Refills: 3 | Status: CANCELLED | OUTPATIENT
Start: 2025-05-29

## 2025-05-30 NOTE — TELEPHONE ENCOUNTER
Requested Prescriptions     Pending Prescriptions Disp Refills    Norethin Ace-Eth Estrad-FE (TONY DICK 1.5/30) 1.5-30 MG-MCG Oral Tab 84 tablet 3     Sig: Take 1 tablet by mouth daily.     Last annual 8/21/23, with problem visits 10/2/24 and 11/22/24.  Last filled 11/22/24 x 1 year  Pap UTD  Mammo UTD    Next annual due. Mychart sent to schedule.

## (undated) DIAGNOSIS — Z13.6 SCREENING FOR CARDIOVASCULAR CONDITION: Primary | ICD-10-CM

## (undated) NOTE — LETTER
11/27/2023              Ozzy Romero        [id number]        [facility] [facility] [address]         To Whom It May Concern:    Ozzy Romero was seen and evaluated in our office on 11/27/23. Based on her current symptoms, please excuse her from work through 11/28/2023. Pending her improvement, her anticipated return to work is 11/29/2023. If you require any more information, please contact our office.         Sincerely,      Paola Johnson MD [facility], [address] [address] [phone]        Document electronically generated by:  Paola Johnson MD

## (undated) NOTE — LETTER
Letty Ramirez Do  [address]  [facility], [facility]       06/15/20        Patient: Crissy Suggs   YOB: 1980   Date of Visit: 6/15/2020       Dear  Dr. Janette Lyle DO,      Thank you for referring Crissy Suggs to my practice.   [place]

## (undated) NOTE — LETTER
Date: 11/8/2018    Patient Name: Norma Argueta          To Whom it may concern: This letter has been written at the patient's request. The above patient was seen at the [facility] for treatment of a medical condition.     This patient sh

## (undated) NOTE — LETTER
[address] [place] [address]  Authorization for Surgical Operation or Procedure  Date: ______________       Time: _______________  1.  I hereby authorize Dr. Rashmi Staples , my physician and the assistant, to perform the following operation and/or p 5. I consent to the photographing of the operations or procedures to be performed for the purposes of advancing medicine, science, and/or education, provided my identity is not revealed.  If the procedure has been videotaped, the physician/surgeon will obta risks and benefits involved in the proposed treatment and any reasonable alternative to the proposed treatment. I have also explained the risks and benefits involved in the refusal of the proposed treatment and have answered the patient's questions.  If I h